# Patient Record
Sex: FEMALE | Race: WHITE | Employment: OTHER | URBAN - METROPOLITAN AREA
[De-identification: names, ages, dates, MRNs, and addresses within clinical notes are randomized per-mention and may not be internally consistent; named-entity substitution may affect disease eponyms.]

---

## 2020-06-18 ENCOUNTER — HOSPITAL ENCOUNTER (OUTPATIENT)
Dept: PHYSICAL THERAPY | Age: 70
Discharge: HOME | End: 2020-06-18
Payer: MEDICARE

## 2020-06-18 PROCEDURE — 97161 PT EVAL LOW COMPLEX 20 MIN: CPT

## 2020-06-18 PROCEDURE — 97140 MANUAL THERAPY 1/> REGIONS: CPT

## 2020-06-18 PROCEDURE — 97110 THERAPEUTIC EXERCISES: CPT

## 2020-06-18 NOTE — PROGRESS NOTES
PHYSICAL THERAPY - DAILY TREATMENT NOTE    Patient Name: Mal Bustamante        Date: 2020  : 1950   YES Patient  Verified  Visit #:      12  Insurance: Payor: Quita Hobbs / Plan: [address] [employer] / Product Type: Managed Care Medicare /      In time: 414 Out time: 1010   Total Treatment Time: 55     BCBS/Medicare Time Tracking (below)   Total Timed Codes (min):  45 1:1 Treatment Time:  45     TREATMENT AREA =  Left elbow pain [M25.522]    SUBJECTIVE  Pain Level (on 0 to 10 scale):  0  / 10   Medication Changes/New allergies or changes in medical history, any new surgeries or procedures?     NO    If yes, update Summary List   Subjective Functional Status/Changes:  []  No changes reported     See POC            Modalities Rationale:     decrease inflammation and decrease pain to improve patient's ability to perform pain free ADLs    min [] Estim, type/location:                                      []  att     []  unatt     []  w/US     []  w/ice    []  w/heat    min []  Mechanical Traction: type/lbs                   []  pro   []  sup   []  int   []  cont    []  before manual    []  after manual    min []  Ultrasound, settings/location:      min []  Iontophoresis w/ dexamethasone, location:                                               []  take home patch       []  in clinic   10 min [x]  Ice     []  Heat    location/position: L elbow in supine     min []  Vasopneumatic Device, press/temp:     min []  Other:    [x] Skin assessment post-treatment (if applicable):    [x]  intact    [x]  redness- no adverse reaction     []redness  adverse reaction:        10 min Therapeutic Exercise:  [x]  See flow sheet   Rationale:      increase ROM to improve the patients ability to perform pain free dressing     10 min Manual Therapy: Gentle end range stretching into flexion and extension of the L elbow, stretching into pronation and supination   Rationale:      decrease pain, increase ROM and increase tissue extensibility to improve patient's ability to perform pain free ADLs       Billed With/As:   [] TE   [] TA   [] Neuro   [] Self Care Patient Education: [x] Review HEP    [] Progressed/Changed HEP based on:   [] positioning   [] body mechanics   [] transfers   [] heat/ice application    [] other:      Other Objective/Functional Measures:    See POC     Post Treatment Pain Level (on 0 to 10) scale:   0  / 10     ASSESSMENT  Assessment/Changes in Function:     See PCO      []  See Progress Note/Recertification   Patient will continue to benefit from skilled PT services to modify and progress therapeutic interventions, address functional mobility deficits, address ROM deficits, address strength deficits, analyze and address soft tissue restrictions, analyze and cue movement patterns, analyze and modify body mechanics/ergonomics and assess and modify postural abnormalities to attain remaining goals.    Progress toward goals / Updated goals:    See POC      PLAN  [x]  Upgrade activities as tolerated YES Continue plan of care   []  Discharge due to :    []  Other:      Therapist: Ge Colon    Date: 6/18/2020 Time: 2:15 PM     Future Appointments   Date Time Provider Miguel Daly   6/23/2020 10:45 AM Evan Confer [facility]   6/25/2020  9:15 AM Evan Confer [facility]   6/30/2020  9:15 AM Evan Confer [facility]   7/2/2020  9:15 AM Evan Confer [facility]   7/7/2020  9:15 AM Evan Confer [facility]   7/9/2020 10:00 AM Evan Confer [facility]   7/14/2020  9:15 AM Evan Confer [facility]   7/16/2020 10:00 AM Evan Confer [facility]   7/21/2020  9:15 AM Evan Confer [facility]   7/23/2020  9:15 AM Evan Confer [facility]

## 2020-06-18 NOTE — PROGRESS NOTES
[facility] PHYSICAL THERAPY AT [address] [place] [address]  [address], [address], [address]  Phone: [phone]  Fax: [phone] / [facility]  Patient Name: Owen Reed : 1950   Medical   Diagnosis: L elbow pain Treatment Diagnosis: Left elbow pain [M25.522]   Onset Date: 2020     Referral Source: Jaylin Coronado MD [facility]): 2020   Prior Hospitalization: See medical history Provider #: [id number]   Prior Level of Function: Unlimited use of LUE, gardening, exercises   Comorbidities: Previous L wrist surgery, osteoporosis   Medications: Verified on Patient Summary List   The Plan of Care and following information is based on the information from the initial evaluation.   ===========================================================================================  Assessment / key information:  Patient is a 81year old female presenting to [facility] with a dx of closed fracture of L elbow with routine healing. Patient reports falling on 2020 where she tripped over a concrete block and landed directly onto her L elbow. She then noted pain became worse and saw her surgeon. She then had what she describes an ORIF of the L olecranon on 2020. She reports being in a hard cast and then progressing to a brace which was removed on 2020. She reports the only restriction she now has is a 5 pound weight limit for lifting. Since removal of her brace she has performed > 2 hours of gardening with minimal to no pain. She reports being relatively pain free.   Today upon objective examination the following was found: 1) notable warmth and slight increase in swelling along lateral elbow, incision is healing nicely with good scar mobility and no signs of infection 2) PROM of L elbow lacks 30 degrees from extension and 45 degrees from full flexion all pain free at end range. After manual gentle PROM and stretching she was able to come within 15 degrees of full extension and 30 degrees from full flexion. Supination and pronation is full and pain free. 3) strength into flexion 4/5, extension 3+/5, ER and IR 4/5, shoulder flexion 4/5. Patient signs and symptoms are consistent with L elbow pain s/p ORIF.   Patient would benefit from skilled PT services in order to increase strength, range of motion, balance, proprioception, coordination in order to improve ease with ADLs and functional mobility.   ===========================================================================================  Eval Complexity: History MEDIUM  Complexity : 1-2 comorbidities / personal factors will impact the outcome/ POC ;  Examination  LOW Complexity : 1-2 Standardized tests and measures addressing body structure, function, activity limitation and / or participation in recreation ; Presentation LOW Complexity : Stable, uncomplicated ;  Decision Making MEDIUM Complexity : FOTO score of 26-74; Overall Complexity LOW   Problem List: pain affecting function, decrease ROM, decrease strength, edema affecting function, impaired gait/ balance, decrease ADL/ functional abilitiies, decrease activity tolerance, decrease flexibility/ joint mobility and decrease transfer abilities   Treatment Plan may include any combination of the following: Therapeutic exercise, Therapeutic activities, Neuromuscular re-education, Physical agent/modality, Gait/balance training, Manual therapy, Aquatic therapy, Patient education, Self Care training, Functional mobility training, Home safety training and Stair training  Patient / Family readiness to learn indicated by: asking questions, trying to perform skills and interest  Persons(s) to be included in education: patient (P)  Barriers to Learning/Limitations: None  Measures taken, if barriers to learning:    Patient Goal (s): \"for my arm to straighten 180 degrees and to be able to touch my left shoulder with my left hand\"   Patient self reported health status: excellent  Rehabilitation Potential: good   Short Term Goals: To be accomplished in  3  weeks:  1) Patient will be I and compliant with a basic home exercise program to improve flexiibility of the L elbow for dressing. 2) Patient to achieve 10 degrees from full extension and 15 degrees from full flexion of the L able to allow for ease with gardening.  Long Term Goals: To be accomplished in  6  weeks:  1) Patient will be I and compliant with a progressive, high level HEP in order to allow for ease with ADLs. 2) Patient will restore AROM of the L elbow to full and pain free to allow for ease with dressing. 3) Patient to increase L elbow strength to 4/5 in order to allow for ease with ADLs. 4) Patient will increase FOTO score to >/=78 in order to allow for with opening jars. Frequency / Duration:   Patient to be seen  2  times per week for 6  weeks:  Patient / Caregiver education and instruction: self care, activity modification and exercises  Therapist Signature: David Mojica PT DPT  Date: 1/68/0961   Certification Period: 6/18/2020- 9/16/2020 Time: 9:14 AM   ===========================================================================================  I certify that the above Physical Therapy Services are being furnished while the patient is under my care. I agree with the treatment plan and certify that this therapy is necessary. Physician Signature:        Date:       Time:     Please sign and return to [facility] or you may fax the signed copy to [phone]. Thank you.

## 2020-06-23 ENCOUNTER — HOSPITAL ENCOUNTER (OUTPATIENT)
Dept: PHYSICAL THERAPY | Age: 70
Discharge: HOME | End: 2020-06-23
Payer: MEDICARE

## 2020-06-23 PROCEDURE — 97140 MANUAL THERAPY 1/> REGIONS: CPT

## 2020-06-23 PROCEDURE — 97110 THERAPEUTIC EXERCISES: CPT

## 2020-06-23 NOTE — PROGRESS NOTES
PHYSICAL THERAPY - DAILY TREATMENT NOTE    Patient Name: Morro Wheat        Date: 2020  : 1950   YES Patient  Verified  Visit #:      12  Insurance: Payor: Evan Ely / Plan: Elias Mckeon / Product Type: Managed Care Medicare /      In time:  Out time: 225   Total Treatment Time: 50     BCBS/Medicare Time Tracking (below)   Total Timed Codes (min):  40 1:1 Treatment Time:  40     TREATMENT AREA =  Left elbow pain [M25.522]    SUBJECTIVE  Pain Level (on 0 to 10 scale):  0  / 10   Medication Changes/New allergies or changes in medical history, any new surgeries or procedures? NO    If yes, update Summary List   Subjective Functional Status/Changes:  []  No changes reported     Patient reports that her elbow felt really good after last visit but she has been in and out of urgent care due to facial fungus, chiggers, and 2 ticks on tick was on her L forearm.             Modalities Rationale:     decrease inflammation and decrease pain to improve patient's ability to perform pain free ADLs    min [] Estim, type/location:                                      []  att     []  unatt     []  w/US     []  w/ice    []  w/heat    min []  Mechanical Traction: type/lbs                   []  pro   []  sup   []  int   []  cont    []  before manual    []  after manual    min []  Ultrasound, settings/location:      min []  Iontophoresis w/ dexamethasone, location:                                               []  take home patch       []  in clinic   10 min [x]  Ice     []  Heat    location/position: R elbow in sitting     min []  Vasopneumatic Device, press/temp:     min []  Other:    [x] Skin assessment post-treatment (if applicable):    [x]  intact    [x]  redness- no adverse reaction     []redness  adverse reaction:        25 min Therapeutic Exercise:  [x]  See flow sheet   Rationale:      increase ROM, increase strength, improve coordination, improve balance and increase proprioception to improve the patients ability to preform unlimited ADLs     15 min Manual Therapy: Contract relax into elbow flexion and extension, elbow flexion scoop mobilization, radial mobilizations into supination and pronation, over pressure and end range stretching into elbow flexion and extension. Rationale:      decrease pain, increase ROM and increase tissue extensibility to improve patient's ability to perform pain free ADLs       Billed With/As:   [] TE   [] TA   [] Neuro   [] Self Care Patient Education: [x] Review HEP    [] Progressed/Changed HEP based on:   [] positioning   [] body mechanics   [] transfers   [] heat/ice application    [] other:      Other Objective/Functional Measures:    Achieve near full extension of the L elbow. She continues to lack end range flexion     Post Treatment Pain Level (on 0 to 10) scale:   0  / 10     ASSESSMENT  Assessment/Changes in Function:     Patient is tolerating gentle flexibility and strengthening of the L elbow. []  See Progress Note/Recertification   Patient will continue to benefit from skilled PT services to modify and progress therapeutic interventions, address functional mobility deficits, address ROM deficits, address strength deficits, analyze and address soft tissue restrictions, analyze and cue movement patterns, analyze and modify body mechanics/ergonomics, assess and modify postural abnormalities, address imbalance/dizziness and instruct in home and community integration to attain remaining goals.    Progress toward goals / Updated goals:    Progressing towards LTG 2      PLAN  [x]  Upgrade activities as tolerated YES Continue plan of care   []  Discharge due to :    []  Other:      Therapist: Lisa Hernandez    Date: 6/23/2020 Time: 4:13 PM     Future Appointments   Date Time Provider Miguel Daly   6/25/2020  9:15 AM Sybil [facility]   6/30/2020  9:15 AM Sybil [facility]   7/2/2020  9:15 AM Jo-Ann Gio Person [facility]   7/7/2020  9:15 AM Alfornia Venedocia [facility]   7/9/2020 10:00 AM Alfornia Venedocia [facility]   7/14/2020  9:15 AM Alfroberta Adam [facility]   7/16/2020 10:00 AM Alfornia Venedocia [facility]   7/21/2020  9:15 AM Alfornia Venedocia [facility]   7/23/2020  9:15 AM Alfornia Venedocia [facility]

## 2020-06-25 ENCOUNTER — HOSPITAL ENCOUNTER (OUTPATIENT)
Dept: PHYSICAL THERAPY | Age: 70
Discharge: HOME | End: 2020-06-25
Payer: MEDICARE

## 2020-06-25 PROCEDURE — 97110 THERAPEUTIC EXERCISES: CPT

## 2020-06-25 PROCEDURE — 97140 MANUAL THERAPY 1/> REGIONS: CPT

## 2020-06-25 NOTE — PROGRESS NOTES
PHYSICAL THERAPY - DAILY TREATMENT NOTE    Patient Name: Hillary Castle        Date: 2020  : 1950   YES Patient  Verified  Visit #:   3   of   12  Insurance: Payor: Aleene Councilman / Plan: Johnny Ceja / Product Type: Managed Care Medicare /      In time: 268 Out time: 1010   Total Treatment Time: 55     BCBS/Medicare Time Tracking (below)   Total Timed Codes (min):  45 1:1 Treatment Time:  45     TREATMENT AREA =  Left elbow pain [M25.522]    SUBJECTIVE  Pain Level (on 0 to 10 scale):  0  / 10   Medication Changes/New allergies or changes in medical history, any new surgeries or procedures? NO    If yes, update Summary List   Subjective Functional Status/Changes:  []  No changes reported     Patient reported some soreness in her elbow that she may have felt after last visit, but nothing bad.              Modalities Rationale:     decrease edema, decrease inflammation and decrease pain to improve patient's ability to perform pain free ADLs    min [] Estim, type/location:                                      []  att     []  unatt     []  w/US     []  w/ice    []  w/heat    min []  Mechanical Traction: type/lbs                   []  pro   []  sup   []  int   []  cont    []  before manual    []  after manual    min []  Ultrasound, settings/location:      min []  Iontophoresis w/ dexamethasone, location:                                               []  take home patch       []  in clinic   10 min [x]  Ice     []  Heat    location/position: L elbow in sitting     min []  Vasopneumatic Device, press/temp:     min []  Other:    [x] Skin assessment post-treatment (if applicable):    [x]  intact    [x]  redness- no adverse reaction     []redness  adverse reaction:        35 min Therapeutic Exercise:  [x]  See flow sheet   Rationale:      increase ROM, increase strength, improve coordination, improve balance and increase proprioception to improve the patients ability to perform unlimited ADLs     10 min Manual Therapy: Radial head mobilizations into supination, distraction of L elbow joint, gentle over pressure into elbow flexion, contract relax into elbow flexion   Rationale:      decrease pain, increase ROM and increase tissue extensibility to improve patient's ability to preform pain free ADLs         Billed With/As:   [] TE   [] TA   [] Neuro   [] Self Care Patient Education: [x] Review HEP    [] Progressed/Changed HEP based on:   [] positioning   [] body mechanics   [] transfers   [] heat/ice application    [] other:      Other Objective/Functional Measures:    Patient able to touch L shoulder with L hand today after stretching  Added wrist strengthening      Post Treatment Pain Level (on 0 to 10) scale:   0  / 10     ASSESSMENT  Assessment/Changes in Function:     Patient is tolerating manual therapy and therapeutic exercise well with no increase in pain or complaints of pain throughout therapy. []  See Progress Note/Recertification   Patient will continue to benefit from skilled PT services to modify and progress therapeutic interventions, address functional mobility deficits, address ROM deficits, address strength deficits, analyze and address soft tissue restrictions, analyze and cue movement patterns, analyze and modify body mechanics/ergonomics, assess and modify postural abnormalities, address imbalance/dizziness and instruct in home and community integration to attain remaining goals. Progress toward goals / Updated goals:    Progressing towards LTG 2.      PLAN  [x]  Upgrade activities as tolerated YES Continue plan of care   []  Discharge due to :    []  Other:      Therapist: Loco Jean Baptiste    Date: 6/25/2020 Time: 10:39 AM     Future Appointments   Date Time Provider Miguel Daly   6/30/2020  9:15 AM Malena Crespo [facility]   7/2/2020  9:15 AM Malena Crespo [facility]   7/7/2020  9:15 AM Balaji BARAJAS [facility]   7/9/2020 10:00 AM Jo-Ann Lisseth oCrona [facility]   7/14/2020  9:15 AM Eavn Confer [facility]   7/16/2020 10:00 AM Evan Confer [facility]   7/21/2020  9:15 AM Evan Confer [facility]   7/23/2020  9:15 AM Evan Confer [facility]

## 2020-06-30 ENCOUNTER — HOSPITAL ENCOUNTER (OUTPATIENT)
Dept: PHYSICAL THERAPY | Age: 70
Discharge: HOME | End: 2020-06-30
Payer: MEDICARE

## 2020-06-30 PROCEDURE — 97140 MANUAL THERAPY 1/> REGIONS: CPT

## 2020-06-30 PROCEDURE — 97110 THERAPEUTIC EXERCISES: CPT

## 2020-06-30 NOTE — PROGRESS NOTES
PHYSICAL THERAPY - DAILY TREATMENT NOTE    Patient Name: Jennifer Subramanian        Date: 2020  : 1950   YES Patient  Verified  Visit #:     Insurance: Payor: Floyd Bello / Plan: Meghan Mehta / Product Type: Managed Care Medicare /      In time: 845 Out time: 1010   Total Treatment Time: 50     BCBS/Medicare Time Tracking (below)   Total Timed Codes (min):  40 1:1 Treatment Time:  40     TREATMENT AREA =  Left elbow pain [M25.522]    SUBJECTIVE  Pain Level (on 0 to 10 scale):  0  / 10   Medication Changes/New allergies or changes in medical history, any new surgeries or procedures? NO    If yes, update Summary List   Subjective Functional Status/Changes:  []  No changes reported     Patient denies any soreness from last visit and continues to notice improvements in elbow ROM with dressing and bathing.             Modalities Rationale:     decrease inflammation and decrease pain to improve patient's ability to perform pain free ADLs    min [] Estim, type/location:                                      []  att     []  unatt     []  w/US     []  w/ice    []  w/heat    min []  Mechanical Traction: type/lbs                   []  pro   []  sup   []  int   []  cont    []  before manual    []  after manual    min []  Ultrasound, settings/location:      min []  Iontophoresis w/ dexamethasone, location:                                               []  take home patch       []  in clinic   10 min [x]  Ice     []  Heat    location/position: L elbow in sitting    min []  Vasopneumatic Device, press/temp:     min []  Other:    [x] Skin assessment post-treatment (if applicable):    [x]  intact    [x]  redness- no adverse reaction     []redness  adverse reaction:        30 min Therapeutic Exercise:  [x]  See flow sheet   Rationale:      increase ROM, increase strength, improve coordination, improve balance and increase proprioception to improve the patients ability to perform unlimited ADLs      10 min Manual Therapy: Gentle PROM and end range stretching into elbow flex/ext and supination/pronation with gentle distraction mobilization and scar tissue mobilization   Rationale:      decrease pain, increase ROM and increase tissue extensibility to improve patient's ability to improve ease with dressing. Billed With/As:   [] TE   [] TA   [] Neuro   [] Self Care Patient Education: [x] Review HEP    [] Progressed/Changed HEP based on:   [] positioning   [] body mechanics   [] transfers   [] heat/ice application    [] other:      Other Objective/Functional Measures:    Patient able to actively flex left elbow and flex wrist and touch ipsilateral shoulder  Added weight bicep and hammer curl  Improved scar tissue movement after manual therapy today     Post Treatment Pain Level (on 0 to 10) scale:   0  / 10     ASSESSMENT  Assessment/Changes in Function:     Patient is making good progress with PT rx in regards to ROM and strength     []  See Progress Note/Recertification   Patient will continue to benefit from skilled PT services to modify and progress therapeutic interventions, address functional mobility deficits, address ROM deficits, address strength deficits, analyze and address soft tissue restrictions, analyze and cue movement patterns, analyze and modify body mechanics/ergonomics, assess and modify postural abnormalities, address imbalance/dizziness and instruct in home and community integration to attain remaining goals. Progress toward goals / Updated goals:    Progressing towards LTG 2.       PLAN  [x]  Upgrade activities as tolerated YES Continue plan of care   []  Discharge due to :    []  Other:      Therapist: Joleen Thomas    Date: 6/30/2020 Time: 10:38 AM     Future Appointments   Date Time Provider Miguel Daly   7/2/2020  9:15 AM Mary Jane Citron [facility]   7/7/2020  9:15 AM Mary Jane Citron [facility]   7/9/2020 10:00 AM Mary Jane Citron [facility] 7/14/2020  9:15 AM Amena BARAJAS [facility]   7/16/2020 10:00 AM Erving Seats [facility]   7/21/2020  9:15 AM Erving Seats [facility]   7/23/2020  9:15 AM Erving Seats [facility]

## 2020-07-02 ENCOUNTER — HOSPITAL ENCOUNTER (OUTPATIENT)
Dept: PHYSICAL THERAPY | Age: 70
Discharge: HOME | End: 2020-07-02
Payer: MEDICARE

## 2020-07-02 PROCEDURE — 97110 THERAPEUTIC EXERCISES: CPT

## 2020-07-02 PROCEDURE — 97140 MANUAL THERAPY 1/> REGIONS: CPT

## 2020-07-02 NOTE — PROGRESS NOTES
[address] [facility] PHYSICAL THERAPY AT [address] [place] [address]  [facility], [address] [place] [address], [address]  Phone: [phone]  Fax: [phone]  PROGRESS NOTE  Patient Name: Poornima Bean : 1950   Treatment/Medical Diagnosis: Left elbow pain [M25.522]   Referral Source: Marcos Noriega MD     Date of Initial Visit: 2020 Attended Visits: 5 Missed Visits: 0     SUMMARY OF TREATMENT  Therapeutic exercise and manual therapy to improve strength, range of motion, balance, proprioception, coordination in order to improve ease with ADLs and functional mobility. CURRENT STATUS  Ms. Jennifer Herrera is s/p L elbow ORIF on 2020. She had excellent progress with PT rx and reports overall no pain or limitations with the L elbow with the expcetion of MD ordered restriction of 5 pound lifting limit. She has resumed all gardening activities and can perform all hygiene without compensation with the L UE. Overall joint mobility is WNL and supination/pronation AROM is full and pain free, elbow extension is to 0 degrees and flexion is to 145 degrees with gentle over pressure, pain free. Patient strength is 4+/5 throughout. She has been issued a detailed home exercise program and was agreeable to be placed on 30 day hold to see how she manages with HEP. Goal/Measure of Progress Goal Met? 1. Patient will be I and compliant with a progressive, high level HEP in order to allow for ease with ADLs. Status at last Eval: established Current Status: I and compliant  yes   2. Patient will restore AROM of the L elbow to full and pain free to allow for ease with dressing. Status at last Eval: lacks 30 degrees from extension and 45 degrees from full flexion  Current Status: Full and pain free yes   3. Patient to increase L elbow strength to 4/5 in order to allow for ease with ADLs.    Status at last Eval: flexion 4/5, extension 3+/5, ER and IR 4/5, shoulder flexion 4/5 Current Status: 4+/5 throughout yes   4. Patient will increase FOTO score to >/=78 in order to allow for with opening jars. Status at last Eval: 79 Current Status: 72, patient had to answer questions according to weight lifting limit so goal not achieved Partially met         RECOMMENDATIONS  Hold therapy for 30 days to see how patient manages sx outside of clinic due to good progress with overall goals. If you have any questions/comments please contact us directly at [phone]. Thank you for allowing us to assist in the care of your patient. Therapist Signature: Angi Garrido PT DP T Date: 7/2/2020   Reporting period:  6/18/2020- 7/2/2020 Time: 3:16 PM   NOTE TO PHYSICIAN:  PLEASE COMPLETE THE ORDERS BELOW AND FAX TO   [facility]: [phone]. If you are unable to process this request in 24 hours please contact our office: [phone].    ___ I have read the above report and request that my patient continue as recommended.   ___ I have read the above report and request that my patient continue therapy with the following changes/special instructions:_________________________________________________________   ___ I have read the above report and request that my patient be discharged from therapy.      Physician Signature:        Date:       Time:

## 2020-07-02 NOTE — PROGRESS NOTES
PHYSICAL THERAPY - DAILY TREATMENT NOTE    Patient Name: Whitney Murguia        Date: 2020  : 1950   YES Patient  Verified  Visit #:   5   of   5  Insurance: Payor: Eric Montanez / Plan: Elyssa Reynolds / Product Type: Managed Care Medicare /      In time: 183 Out time: 1000   Total Treatment Time: 45     BCBS/Medicare Time Tracking (below)   Total Timed Codes (min):  45 1:1 Treatment Time:  45     TREATMENT AREA =  Left elbow pain [M25.522]    SUBJECTIVE  Pain Level (on 0 to 10 scale):  0  / 10   Medication Changes/New allergies or changes in medical history, any new surgeries or procedures? NO    If yes, update Summary List   Subjective Functional Status/Changes:  []  No changes reported     Patient reports no issues or pain throughout her elbow and has not needed ice it. She hasn't noticed any swelling or warmth.            Modalities Rationale:  PD   min [] Estim, type/location:                                      []  att     []  unatt     []  w/US     []  w/ice    []  w/heat    min []  Mechanical Traction: type/lbs                   []  pro   []  sup   []  int   []  cont    []  before manual    []  after manual    min []  Ultrasound, settings/location:      min []  Iontophoresis w/ dexamethasone, location:                                               []  take home patch       []  in clinic    min []  Ice     []  Heat    location/position:     min []  Vasopneumatic Device, press/temp:     min []  Other:    [x] Skin assessment post-treatment (if applicable):    [x]  intact    [x]  redness- no adverse reaction     []redness  adverse reaction:        35 min Therapeutic Exercise:  [x]  See flow sheet   Rationale:      increase ROM, increase strength, improve coordination, improve balance and increase proprioception to improve the patients ability to perform unlimited ADLs      10 min Manual Therapy: Gentle PROM into elbow flexion/extension and supination/pronation with end range stretch, gentle L elbow distraction   Rationale:      decrease pain, increase ROM and increase tissue extensibility to improve patient's ability to perform pain free ADLs       Billed With/As:   [] TE   [] TA   [] Neuro   [] Self Care Patient Education: [x] Review HEP    [] Progressed/Changed HEP based on:   [] positioning   [] body mechanics   [] transfers   [] heat/ice application    [] other:      Other Objective/Functional Measures:    Elbow AAROM: 0- 145 degrees without pain  Strength 4+/5 throughout     Post Treatment Pain Level (on 0 to 10) scale:   0  / 10     ASSESSMENT  Assessment/Changes in Function:     Patient has made good progress with PT rx and was agreeable to safe DC from PT.      []  See Progress Note/Recertification   Patient will continue to benefit from skilled PT services to modify and progress therapeutic interventions, address functional mobility deficits, address ROM deficits, address strength deficits, analyze and address soft tissue restrictions, analyze and cue movement patterns, analyze and modify body mechanics/ergonomics, assess and modify postural abnormalities and address imbalance/dizziness to attain remaining goals. Progress toward goals / Updated goals:    Progressing towards all goals. PLAN  []  Upgrade activities as tolerated YES Continue plan of care   []  Discharge due to :    []  Other:      Therapist: Richelle Ramirez    Date: 7/2/2020 Time: 10:07 AM     No future appointments.

## 2020-07-07 ENCOUNTER — APPOINTMENT (OUTPATIENT)
Dept: PHYSICAL THERAPY | Age: 70
End: 2020-07-07
Payer: MEDICARE

## 2020-07-09 ENCOUNTER — APPOINTMENT (OUTPATIENT)
Dept: PHYSICAL THERAPY | Age: 70
End: 2020-07-09
Payer: MEDICARE

## 2020-07-14 ENCOUNTER — APPOINTMENT (OUTPATIENT)
Dept: PHYSICAL THERAPY | Age: 70
End: 2020-07-14
Payer: MEDICARE

## 2020-07-16 ENCOUNTER — APPOINTMENT (OUTPATIENT)
Dept: PHYSICAL THERAPY | Age: 70
End: 2020-07-16
Payer: MEDICARE

## 2020-07-21 ENCOUNTER — APPOINTMENT (OUTPATIENT)
Dept: PHYSICAL THERAPY | Age: 70
End: 2020-07-21
Payer: MEDICARE

## 2020-07-23 ENCOUNTER — APPOINTMENT (OUTPATIENT)
Dept: PHYSICAL THERAPY | Age: 70
End: 2020-07-23
Payer: MEDICARE

## 2020-10-06 NOTE — PROGRESS NOTES
[address] [facility] PHYSICAL THERAPY AT [address] [place] [address]  [address], [address] [place] [address], [address]  Phone: [phone]  Fax: [phone] FOR PHYSICAL THERAPY          Patient Name: Tarun Rand : 1950   Treatment/Medical Diagnosis: Left elbow pain [M25.522]   Onset Date: 2020    Referral Source: Gay Arechiga MD [facility]): 2020   Prior Hospitalization: See Medical History Provider #: [id number]   Prior Level of Function: Unlimited use of LUE, gardening, exercises   Comorbidities: Previous L wrist surgery, osteoporosos   Medications: Verified on Patient Summary List   Visits from [facility]: 5 Missed Visits: 0     Key Functional Changes/Progress: Ms. Matthieu Bedoya was last seen on 2020 where her most progress note was written. She was placed on 30 day hold due to overall good progress with physical therapy and no further contact with clinic was made. Patient will be DC from physical therapy; please refer to previous progress note for progress towards goals. .       Assessments/Recommendations: Discontinue therapy. Progressing towards or have reached established goals. If you have any questions/comments please contact us directly at [phone]. Thank you for allowing us to assist in the care of your patient.     Therapist Signature: Richelle Ramirez PT DPT  Date: 10/6/2020   Reporting Period: 2020- 2020 Time: 4:27 PM